# Patient Record
Sex: MALE | Race: WHITE | URBAN - METROPOLITAN AREA
[De-identification: names, ages, dates, MRNs, and addresses within clinical notes are randomized per-mention and may not be internally consistent; named-entity substitution may affect disease eponyms.]

---
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
## 2023-08-23 NOTE — PROGRESS NOTES
"OUTPATIENT PSYCHOTHERAPY PROGRESS NOTE    Client Name: Anup Rivas   YOB: 2005 (13 year old)   Date of Service: December 17, 2018  Time of Service: 3:00 to 4:00 (60 minutes)  Service Type(s): 95361 (53+min with patient and/or family)    Individuals Present: patient    Treatment goal(s) being addressed: OCD symptoms    Data: Reviewed exposure homework for OCD symptoms involving contamination and social perceptions of others. Anup reported implementing both planned and naturalistic exposures and described a reduction in rituals and obsessions, saying \"I move on or forget my obsessions more easily.\" Discussed strategies for continuing to implement exposures targeting these OCD domains. Agreed to begin exposure work targeting obsessions involving fear of permanent physical damage (e.g., worry that hearing will be permanently damaged if headphones are above low volume, interpretations of physical discomfort as potentially serious illness). Conducted in-session exposure involving listening to a medium volume tone and focusing on worry of hearing damage. Developed plan for exposure practice and ritual prevention at home, including strategies for maintaining skill practice while family is on holiday vacation.    Assessment: Anup is a 13-year old boy presenting for treatment of OCD. Content of OCD includes themes of contamination, doubt related to fear of permanent physical or social damage, and morality. Anup reports reduced frequency and distress for OCD symptoms targeted thus far with exposure. Overall OCD symptoms are currently in the moderate range of severity. Anup demonstrates good engagement in exposure-based CBT, generalization of exposure strategies, and increasing ability to resist targeted rituals. Today Anup was insightful about symptoms, engaged in the session, and able to implement exposure as directed. Anup is likely to continue to benefit from exposure-based CBT for OCD.    Diagnoses: 300.3 " Obsessive-Compulsive Disorder    Plan: Daily exposure practice involving 1) touching contaminated objects, 2) altering something about appearance or speaking, 3) listening to sound or headphone volume that OCD dislikes. Next therapy appointment has been scheduled for January to continue work on treatment goals.     Provider: Brionna Tracey, PhD,   Licensed Psychologist       Hemostasis: Electrocautery

## 2025-08-19 ENCOUNTER — VIRTUAL VISIT (OUTPATIENT)
Dept: FAMILY MEDICINE | Facility: CLINIC | Age: 20
End: 2025-08-19
Payer: COMMERCIAL

## 2025-08-19 DIAGNOSIS — F41.9 ANXIETY: ICD-10-CM

## 2025-08-19 DIAGNOSIS — F42.9 OBSESSIVE-COMPULSIVE DISORDER, UNSPECIFIED TYPE: Primary | ICD-10-CM

## 2025-08-19 DIAGNOSIS — F33.1 MODERATE EPISODE OF RECURRENT MAJOR DEPRESSIVE DISORDER (H): ICD-10-CM

## 2025-08-19 PROCEDURE — 98002 SYNCH AUDIO-VIDEO NEW MOD 45: CPT | Performed by: FAMILY MEDICINE

## 2025-08-19 RX ORDER — FLUOXETINE 10 MG/1
10 CAPSULE ORAL DAILY
COMMUNITY
End: 2025-08-19

## 2025-08-19 ASSESSMENT — ANXIETY QUESTIONNAIRES
1. FEELING NERVOUS, ANXIOUS, OR ON EDGE: MORE THAN HALF THE DAYS
IF YOU CHECKED OFF ANY PROBLEMS ON THIS QUESTIONNAIRE, HOW DIFFICULT HAVE THESE PROBLEMS MADE IT FOR YOU TO DO YOUR WORK, TAKE CARE OF THINGS AT HOME, OR GET ALONG WITH OTHER PEOPLE: VERY DIFFICULT
GAD7 TOTAL SCORE: 11
8. IF YOU CHECKED OFF ANY PROBLEMS, HOW DIFFICULT HAVE THESE MADE IT FOR YOU TO DO YOUR WORK, TAKE CARE OF THINGS AT HOME, OR GET ALONG WITH OTHER PEOPLE?: VERY DIFFICULT
4. TROUBLE RELAXING: MORE THAN HALF THE DAYS
6. BECOMING EASILY ANNOYED OR IRRITABLE: SEVERAL DAYS
2. NOT BEING ABLE TO STOP OR CONTROL WORRYING: SEVERAL DAYS
GAD7 TOTAL SCORE: 11
5. BEING SO RESTLESS THAT IT IS HARD TO SIT STILL: SEVERAL DAYS
7. FEELING AFRAID AS IF SOMETHING AWFUL MIGHT HAPPEN: MORE THAN HALF THE DAYS
GAD7 TOTAL SCORE: 11
3. WORRYING TOO MUCH ABOUT DIFFERENT THINGS: MORE THAN HALF THE DAYS
7. FEELING AFRAID AS IF SOMETHING AWFUL MIGHT HAPPEN: MORE THAN HALF THE DAYS

## 2025-08-19 ASSESSMENT — PATIENT HEALTH QUESTIONNAIRE - PHQ9
SUM OF ALL RESPONSES TO PHQ QUESTIONS 1-9: 9
SUM OF ALL RESPONSES TO PHQ QUESTIONS 1-9: 9
10. IF YOU CHECKED OFF ANY PROBLEMS, HOW DIFFICULT HAVE THESE PROBLEMS MADE IT FOR YOU TO DO YOUR WORK, TAKE CARE OF THINGS AT HOME, OR GET ALONG WITH OTHER PEOPLE: SOMEWHAT DIFFICULT

## 2025-08-20 ENCOUNTER — PATIENT OUTREACH (OUTPATIENT)
Dept: CARE COORDINATION | Facility: CLINIC | Age: 20
End: 2025-08-20
Payer: COMMERCIAL

## 2025-08-23 ENCOUNTER — HEALTH MAINTENANCE LETTER (OUTPATIENT)
Age: 20
End: 2025-08-23